# Patient Record
Sex: FEMALE | Race: WHITE
[De-identification: names, ages, dates, MRNs, and addresses within clinical notes are randomized per-mention and may not be internally consistent; named-entity substitution may affect disease eponyms.]

---

## 2020-06-04 LAB
AMPHETAMINES UR QL SCN: NEGATIVE QUAL
ANION GAP SERPL CALC-SCNC: 12.9 MMOL/L (ref 8–16)
BARBITURATES UR QL SCN: NEGATIVE QUAL
BASOPHILS NFR BLD AUTO: 0.2 % (ref 0–2)
BENZODIAZ UR QL SCN: NEGATIVE QUAL
BUN SERPL-MCNC: 14 MG/DL (ref 7–18)
BZE UR QL SCN: NEGATIVE QUAL
CALCIUM SERPL-MCNC: 8.7 MG/DL (ref 8.5–10.1)
CANNABINOIDS UR QL SCN: NEGATIVE QUAL
CHLORIDE SERPL-SCNC: 106 MMOL/L (ref 98–107)
CO2 SERPL-SCNC: 27.2 MMOL/L (ref 21–32)
CREAT SERPL-MCNC: 0.8 MG/DL (ref 0.6–1.3)
EOSINOPHIL NFR BLD: 2.2 % (ref 0–7)
ERYTHROCYTE [DISTWIDTH] IN BLOOD BY AUTOMATED COUNT: 13.3 % (ref 11.5–14.5)
GLUCOSE SERPL-MCNC: 76 MG/DL (ref 74–106)
HCT VFR BLD CALC: 43.6 % (ref 36–48)
HGB BLD-MCNC: 14.5 G/DL (ref 12–16)
IMM GRANULOCYTES NFR BLD: 0 % (ref 0–5)
LYMPHOCYTES NFR BLD AUTO: 29.9 % (ref 15–50)
MCH RBC QN AUTO: 28.9 PG (ref 26–34)
MCHC RBC AUTO-ENTMCNC: 33.3 G/DL (ref 31–37)
MCV RBC: 86.9 FL (ref 80–100)
MONOCYTES NFR BLD: 7.6 % (ref 2–11)
NEUTROPHILS NFR BLD AUTO: 60.1 % (ref 40–80)
OPIATES UR QL SCN: NEGATIVE QUAL
OSMOLALITY SERPL CALC.SUM OF ELEC: 282 MOSM/KG (ref 275–300)
PCP UR QL SCN: NEGATIVE QUAL
PLATELET # BLD: 262 10X3/UL (ref 130–400)
PMV BLD AUTO: 10.1 FL (ref 7.4–10.4)
POTASSIUM SERPL-SCNC: 4.1 MMOL/L (ref 3.5–5.1)
RBC # BLD AUTO: 5.02 10X6/UL (ref 4–5.4)
SODIUM SERPL-SCNC: 142 MMOL/L (ref 136–145)
WBC # BLD AUTO: 6.5 10X3/UL (ref 4.8–10.8)

## 2020-06-08 ENCOUNTER — HOSPITAL ENCOUNTER (OUTPATIENT)
Dept: HOSPITAL 84 - D.WS | Age: 30
LOS: 1 days | Discharge: HOME | End: 2020-06-09
Attending: OBSTETRICS & GYNECOLOGY
Payer: COMMERCIAL

## 2020-06-08 VITALS — SYSTOLIC BLOOD PRESSURE: 95 MMHG | DIASTOLIC BLOOD PRESSURE: 52 MMHG

## 2020-06-08 VITALS — WEIGHT: 184 LBS | HEIGHT: 65 IN | BODY MASS INDEX: 30.66 KG/M2 | BODY MASS INDEX: 30.66 KG/M2

## 2020-06-08 VITALS — SYSTOLIC BLOOD PRESSURE: 99 MMHG | DIASTOLIC BLOOD PRESSURE: 55 MMHG

## 2020-06-08 VITALS — DIASTOLIC BLOOD PRESSURE: 63 MMHG | SYSTOLIC BLOOD PRESSURE: 105 MMHG

## 2020-06-08 VITALS — SYSTOLIC BLOOD PRESSURE: 94 MMHG | DIASTOLIC BLOOD PRESSURE: 52 MMHG

## 2020-06-08 VITALS — SYSTOLIC BLOOD PRESSURE: 98 MMHG | DIASTOLIC BLOOD PRESSURE: 49 MMHG

## 2020-06-08 VITALS — DIASTOLIC BLOOD PRESSURE: 61 MMHG | SYSTOLIC BLOOD PRESSURE: 115 MMHG

## 2020-06-08 VITALS — SYSTOLIC BLOOD PRESSURE: 107 MMHG | DIASTOLIC BLOOD PRESSURE: 53 MMHG

## 2020-06-08 DIAGNOSIS — N80.3: ICD-10-CM

## 2020-06-08 DIAGNOSIS — N73.6: ICD-10-CM

## 2020-06-08 DIAGNOSIS — R10.2: Primary | ICD-10-CM

## 2020-06-08 LAB — HCG UR QL: NEGATIVE

## 2020-06-08 NOTE — NUR
PAIN MED GIVEN AND LIGHTS TURNED OUT, CALL LIGHT IS WITHIN REACH. PT
UNDERSTANDS TO CALL FOR NURSE WHEN SHE NEEDS TO VOID.

## 2020-06-08 NOTE — NUR
RECEIVED BY BED FROM RECOVERY,  SHE IS AWAKE BUT DROWSY.  RATES PAIN AT 6/10,
ABD SOFT TO TOUCH. HOANG CATH TO BEDSIDE DRAIN WITH 200ML NOTED. IV TO LEFT
FOREARM INFUSING PER ORDERS. SCD BILAT AND PLACED ON PUMP. ORDERS RECEIVED
FROM DR MORALES TO LEAVE HOANG AND VAG PACKING IN PLACE UNITL AFTER 1300 THEN
MAY BE REMOVED AND ADVANCE CARE AS PT TOLERATES.  SIDE RAILS UP X 2 WITH CALL
LIGHT IN REACH.

## 2020-06-08 NOTE — NUR
PM ROUNDS MADE, PT RESTING IN BED, INFORMED PT THAT I WILL BE BACK SHORTLY
TO DO ASSESSMENT, PT VERBALIZES UNDERSTANDING, PT REQUESTED DINNER TRAY TO BE
HEAT UP, DENIES FURTHER NEEDS, PTS MOM AT BEDSIDE

## 2020-06-08 NOTE — OP
PATIENT NAME:  YANIV NUNEZ                      MEDICAL RECORD: [id number]
:88                                             LOCATION:D.OPS          
                                                         ADMISSION DATE:        
SURGEON:  DAVE MORALES MD          
 
 
DATE OF OPERATION:  2020
 
PREOPERATIVE DIAGNOSES:
1.  History of endometriosis.
2.  Pelvic pain.
 
POSTOPERATIVE DIAGNOSES:
1.  History of endometriosis.
2.  Pelvic pain. 
3.  Dense pelvic adhesions.
 
PROCEDURE: Total laparoscopic hysterectomy, bilateral salpingo-oophorectomy. 
 
SURGEON:  Dave Morales MD
 
ASSISTANT:  Dr. Krueger.
 
ANESTHESIOLOGIST:  Dr. Young
 
CRNA:  [place].
 
ANESTHETIC:  General.
 
FINDINGS:  The uterus, tubes and ovaries were unremarkable.  There is retraction
of the peritoneum on the left sidewall.  The small bowel was densely adhered to
the anterior abdominal wall on the right.  There are folds of peritoneum adhered
to each other on the left side coming down the broad ligament.  No classic
powder burn lesions were noted.
 
SPECIMENS REMOVED:  Uterus with cervix, tubes and ovaries.
 
SPECIMEN DISPOSITION:  Pathology.
 
ESTIMATED BLOOD LOSS:  Minimal.
 
FLUIDS:  1500 cc lactated Ringer's.
 
URINE OUTPUT:  100 cc of clear, concentrated urine. 
 
COMPLICATIONS:  None.
 
DRAIN:  Silvestre to gravity.
 
INDICATIONS:  The patient is a 29-year-old female with a history of pelvic pain.
 The patient has had Lupron in the past with resolution of her symptoms.  The
patient requests definitive therapy for history of endometriosis and pelvic
pain.  The patient and I have discussed in the preoperative period the
possibility of ovarian conservation, but requests a bilateral oophorectomy.  HRT
has been discussed.
 
DESCRIPTION OF PROCEDURE:  After informed consent was assured, the patient was
taken to the operating room where anesthetic was obtained.  The patient was now
 
 
 
OPERATIVE REPORT                               [id number]    YANIV NUNEZ    
 
 
prepped and draped in the usual sterile fashion.  The patient has speculum
introduced in the vagina and the cervix was dilated and sounds to approximately
7.5-8 cm.  Large cup VCare was selected and placed.  Attention was directed to
the abdomen.  The legs were positioned for the abdominal and pelvic portion of
this procedure.  The incision was made in the umbilicus to accommodate a 5-mm
trocar, which was inserted without difficulty and  pneumoperitoneum developed. 
The patient was in Trendelenburg position and dense adhesions were noted in the
right adnexa and right lower abdominal wall.  Trocars were now placed in the
left lower quadrant and in the midline.  The graspers inserted from the central
port and the adhesions were placed on gentle traction.  Using a Thunderbeat
coagulation cutter from the left, the tissue was compressed, coagulated, and
 along the edge of the abdominal wall, freeing the bowel.  Once this
has been performed, a 11-mm port was placed in the right lower quadrant.  With
the right tube and ovary elevated, the infundibulopelvic ligament was
compressed, coagulated, and .  This dissection was carried out
underneath the right adnexa across the round ligament and the anterior leaf of
the broad ligament was opened.  The bladder flap was developed to the midline. 
The vessels of the right side were skeletonized, compressed, coagulated, and
 at the level of the internal os.  This dissection was carried down
another centimeter freeing up the vascular bundle.  Attention was now directed
to the left side.  The left tube and ovary was elevated in similar fashion.  The
tissue here was compressed, coagulated, and  underneath the ovary and
tube and continues across the round ligament.  Folds of peritoneum are adhesed
and this was taken down as the anterior leaf of the broad ligament was opened. 
The bladder flap was now fully developed and using a peanut, the tissue further
dissected free of the vaginal cuff.  The vessels of the left side are now
identified, compressed, coagulated, and .  Dissection of the cervix
from the vaginal vault begins at the 9 o'clock position, it was carried from
9-12 and then 9-6.  It concludes from the 12-6 on the right.  Uterus, tubes, and
ovaries were pulled into the vagina and the pneumoperitoneum was maintained.  A
Stratafix stitch was now placed into the pelvis and using laparoscopic needle
, the cuff was closed in a running fashion from right to left.  After the
stitch was cut, the pelvis was irrigated, irrigant removed.  Hanna was placed
across the vaginal cuff.  Sponge, lap, and needle counts correct times 2.  The
pneumoperitoneum was released.  The portion of the ovarian tube was incorporated
into the stitch vaginally and this is easily removed after placing an operative
speculum and removing the tissue with a forceps.  An abrasion was noted from
placement of the cup and this was repaired vaginally.  The vaginal packing was
placed and Silvestre catheter maintained.  Sponge, lap, and needle counts correct
times 2.
 
TRANSINT:[id number] Voice Confirmation ID: [id number] DOCUMENT ID: [id number]
06/10/2020 Edited per Dr. Morales for procedure line, dmm. 
                                           
                                           DAVE MORALES MD          
 
 
CC:                                                             [id number]
DICTATION DATE: 2052     :     20 1449      [facility] 
                                                                      [address]
[place] [phone]

## 2020-06-08 NOTE — NUR
CALLED TO ROOM BY PT MOTHER,  PT HAS AWOKEN AND CANNOT STOP SCRATCHING HER
FACE, NECK AND ARMS. DENIES ANY MED ALLERGIES THAT SHE KNOW OF, BENADRYL GIVEN
AS ORDERED. POSITIONED TO LEFT SIDE, ALSO GIVEN COLD WET WASH CLOTH. CALL
LIGHT IN REACH AND PT MOTHER REMAINS AT HER SIDE.

## 2020-06-08 NOTE — NUR
ASSESSMENT PER FLOW SHEET, VS OBTAINED, SALINE LOCK TO LEFT FA INTACT WITH NO
REDNESS OR EDEMA, LAP INC CDI WITH DERMABOND, CDI WITH NO DRAINAGE NOTED,
FRESH ICE PACK TO ABD, PT INST TO USE CALL LIGHT WHEN NEEDING TO VOID, PT
VERBALIZES UNDRSTANDING, PT C/O PAIN AND CRAMPING, ADM PERCOCET AND TORADOL
PER MD ORDERS, SEE EMAR, PT REQUESTED AND SERVED CRACKERS AND GABRIEL CRACKERS,
WITH FRESH LEMON LIME SODA, PT DENIES FURTHER NEEDS, PT'S MOM AT BEDSIDE

## 2020-06-08 NOTE — NUR
PAIN REASSESSED AND SHE RATES AT 3/10, ICE PACK TO ABDOMEN AND PT TURNED TO
RIGHT LATERAL. HER MOTHER IS AT BEDSIDE. LIGHTS TURNED DOWN.

## 2020-06-08 NOTE — NUR
PT ON CALL LIGHT, PT UP TO BR WITH ASSISTANCE, VOIDED 200 MLS OF DARK YELLOW
URINE BY SELF WITH NO DIFFICULTY, PT PLACED OWN BLACK PAD AND PANTIES, PT BACK
TO BED, VS OBTAINED, DENIES FURTHER NEEDS

## 2020-06-08 NOTE — NUR
CALLED TO ROOM, PT IS AWAKE AND ALERT AT THIS TIME AND AGREEABLE TO D/C HOANG
AND CHANGING ROOM.  HOANG REMOVED PER PROTOCOL WITH 1000ML CLEAR URINE. VAG
PACKING REMOVED EASILY AND PT ABLE TO SIT UP ON SIDE OF BED. AMB TO BATHROOM
BUT UNABLE TO VOID, GOWN CHANGED AND PERIPAD/MESH BRIEFS ALSO ON AT THIS TIME.
TRANSFERRED TO ROOM 1223 BY WHEELCHAIR.

## 2020-06-08 NOTE — NUR
PT AMB IN STREET, PT'S MOM REPORTS THAT PT VOIDED, EMPTIED 200 MLS FROM [facility], PT BACK TO ROOM, DENIES NEEDS AT THIS TIME

## 2020-06-09 VITALS — DIASTOLIC BLOOD PRESSURE: 46 MMHG | SYSTOLIC BLOOD PRESSURE: 91 MMHG

## 2020-06-09 VITALS — DIASTOLIC BLOOD PRESSURE: 55 MMHG | SYSTOLIC BLOOD PRESSURE: 103 MMHG

## 2020-06-09 NOTE — NUR
PT RESTING WITH EYES CLOSED, AROUSES TO SOFT VERBAL STIMULATION, VS OBTAINED,
PT REPORTS THAT HER BP ALWAYS RUNS LOW, ADM PERCOCET PER MD ORDERS FOR PAIN
6/10, SEE EMAR, PT DENIES FURTHER NEEDS

## 2020-06-09 NOTE — NUR
DISCHARGE INSTRUCTIONS REVIEWED WITH PT, VERBALIZES UNDERSTANDING. PERCOCET
PRESCRIPTION PROVIDED TO PT AND [facility], GIVEN TO RAQUEL PHARMACIST AND VERIFIED WITH READBACK. REVIEWED
WITH PT TIMES THAT NEXT DOSES OF MEDS CAN BE TAKEN, VERBALIZES UNDERSTANDING.
ASSISTED PT TO DRESS. PT'S MOTHER TO GET CARE.

## 2020-06-09 NOTE — NUR
ROUNDS MADE. ENCOURAGED PT TO AMBULATE IN STREET. STATES THAT SHE WILL WITH HER
MOTHERS ASSISTANCE. PT EDUCATED ON POSSIBLE POST OP COMPLICATIONS AND
IMPORTANCE OF AMBULATING, VERBALIZES UNDERSTANDING.

## 2020-06-09 NOTE — NUR
RESTING QUIETLY WITH EYES CLOSED. RESP REGULARE AND UNLABORED, NO S/S OF
DISTRESS NOTED. PT'S MOTHER AT BEDSIDE. BED IN LOW POSITION WITH SRUP X2. CALL
LIGHT AND PHONE WITHIN REACH. WILL CONTINUE TO MONITOR.

## 2020-06-09 NOTE — NUR
REQUESTS PAIN MEDICATION AND NAUSEA MEDS PRIOR TO D/C'ING HOME. ZOFRAN AND
PERCOCET PROVIDED PER ORDER FOR C/O ABD AND INCISIONAL DISCOMFORT 7/10. PT
ENCOURAGED TO EAT LUNCH MEAL ALSO WITH PAIN MEDICATION. VERBALIZES
UNDERSTANDING. ICE WATER AND SPRITE PROVIDED. REMAINS SITTING ON EDGE OF BED.
DENIES ADDITIONAL NEEDS. BED IN LOW POSITION WITH SRUP X2. CALL LIGHT AND
PHONE WITHIN REACH. WILL CONTINUE TO MONITOR.

## 2020-06-09 NOTE — NUR
SHIFT ASSESSMENT COMPLETED PER FLOWSHEET. C/O ABD AND INCISIONAL DISCOMFORT
6-7/10. TORADOL AND GABAPENTIN GIVEN PER ORDER AND PT REQUEST. INCISIONS TO
RLQ, LLQ, MIDLINE LINE LOWER ABD AND UMIBILICUS ALL WELL APPROXIMATED WITH
GLUE INTACT, NO DRAINAGE NOTED. PT REPORTS THAT SHE IS VOIDING AND PASSING
FLATUS WITHOUT DIFFICULTY. ICE WATER AND SPRITE PROVIDED, DENIES ADDITIONAL
NEEDS. REFUSES SCD'S AT THIS TIME. INCENTIVE SPIROMETER DONE X10, COUGH AND
DEEP BREATHING DONE WITH GOOD EFFORT. POC DISCUSSED WITH PT, VERBALIZES
UNDERSTANDING AND DENIES QUESTIONS. BED IN LOW POSITION WITH SRUP X2. CALL
LIGHT AND PHONE WITHIN REACH. WILL CONTINUE TO MONITOR.

## 2020-06-09 NOTE — NUR
PT RESTING WITH EYES CLOSED, AROUSES TO SOFT VERBAL STIMULATION, ADM TORADOL
PER MD ORDERS, SEE EMAR, PT DENIES NEEDS AT THIS TIME

## 2020-06-15 ENCOUNTER — HOSPITAL ENCOUNTER (EMERGENCY)
Dept: HOSPITAL 84 - D.ER | Age: 30
Discharge: HOME | End: 2020-06-15
Payer: COMMERCIAL

## 2020-06-15 VITALS — DIASTOLIC BLOOD PRESSURE: 64 MMHG | SYSTOLIC BLOOD PRESSURE: 108 MMHG

## 2020-06-15 VITALS
WEIGHT: 160.34 LBS | WEIGHT: 160.34 LBS | HEIGHT: 65 IN | BODY MASS INDEX: 26.71 KG/M2 | BODY MASS INDEX: 26.71 KG/M2 | HEIGHT: 65 IN

## 2020-06-15 DIAGNOSIS — R11.10: ICD-10-CM

## 2020-06-15 DIAGNOSIS — K59.00: Primary | ICD-10-CM

## 2020-06-15 LAB
ALBUMIN SERPL-MCNC: 3.5 G/DL (ref 3.4–5)
ALP SERPL-CCNC: 82 U/L (ref 30–120)
ALT SERPL-CCNC: 130 U/L (ref 10–68)
AMYLASE SERPL-CCNC: 53 U/L (ref 25–115)
ANION GAP SERPL CALC-SCNC: 11.3 MMOL/L (ref 8–16)
BACTERIA #/AREA URNS HPF: (no result) /HPF
BASOPHILS NFR BLD AUTO: 0.3 % (ref 0–2)
BILIRUB SERPL-MCNC: 0.98 MG/DL (ref 0.2–1.3)
BILIRUB SERPL-MCNC: NEGATIVE MG/DL
BUN SERPL-MCNC: 11 MG/DL (ref 7–18)
CALCIUM SERPL-MCNC: 8.8 MG/DL (ref 8.5–10.1)
CHLORIDE SERPL-SCNC: 103 MMOL/L (ref 98–107)
CO2 SERPL-SCNC: 27.8 MMOL/L (ref 21–32)
CREAT SERPL-MCNC: 0.9 MG/DL (ref 0.6–1.3)
EOSINOPHIL NFR BLD: 2.8 % (ref 0–7)
ERYTHROCYTE [DISTWIDTH] IN BLOOD BY AUTOMATED COUNT: 13 % (ref 11.5–14.5)
GLOBULIN SER-MCNC: 3.8 G/L
GLUCOSE SERPL-MCNC: 91 MG/DL (ref 74–106)
GLUCOSE SERPL-MCNC: NEGATIVE MG/DL
HCT VFR BLD CALC: 44.1 % (ref 36–48)
HGB BLD-MCNC: 14.7 G/DL (ref 12–16)
IMM GRANULOCYTES NFR BLD: 0.1 % (ref 0–5)
INR PPP: 1.02 (ref 0.85–1.17)
KETONES UR STRIP-MCNC: (no result) MG/DL
LIPASE SERPL-CCNC: 74 U/L (ref 73–393)
LYMPHOCYTES NFR BLD AUTO: 28.9 % (ref 15–50)
MCH RBC QN AUTO: 29.1 PG (ref 26–34)
MCHC RBC AUTO-ENTMCNC: 33.3 G/DL (ref 31–37)
MCV RBC: 87.3 FL (ref 80–100)
MONOCYTES NFR BLD: 7.4 % (ref 2–11)
NEUTROPHILS NFR BLD AUTO: 60.5 % (ref 40–80)
NITRITE UR-MCNC: NEGATIVE MG/ML
OSMOLALITY SERPL CALC.SUM OF ELEC: 274 MOSM/KG (ref 275–300)
PH UR STRIP: 5 [PH] (ref 5–6)
PLATELET # BLD: 272 10X3/UL (ref 130–400)
PMV BLD AUTO: 10.8 FL (ref 7.4–10.4)
POTASSIUM SERPL-SCNC: 4.1 MMOL/L (ref 3.5–5.1)
PROT SERPL-MCNC: 7.3 G/DL (ref 6.4–8.2)
PROTHROMBIN TIME: 13.4 SECONDS (ref 11.6–15)
RBC # BLD AUTO: 5.05 10X6/UL (ref 4–5.4)
RBC #/AREA URNS HPF: (no result) /HPF (ref 0–5)
SODIUM SERPL-SCNC: 138 MMOL/L (ref 136–145)
SP GR UR STRIP: 1.01 (ref 1–1.02)
SQUAMOUS #/AREA URNS HPF: (no result) /HPF (ref 0–5)
UROBILINOGEN UR-MCNC: 4 MG/DL
WBC # BLD AUTO: 6.7 10X3/UL (ref 4.8–10.8)
WBC #/AREA URNS HPF: (no result) /HPF